# Patient Record
Sex: FEMALE | Race: ASIAN | NOT HISPANIC OR LATINO | Employment: UNEMPLOYED | URBAN - METROPOLITAN AREA
[De-identification: names, ages, dates, MRNs, and addresses within clinical notes are randomized per-mention and may not be internally consistent; named-entity substitution may affect disease eponyms.]

---

## 2023-12-05 ENCOUNTER — OFFICE VISIT (OUTPATIENT)
Dept: OBSTETRICS AND GYNECOLOGY | Facility: CLINIC | Age: 33
End: 2023-12-05
Payer: COMMERCIAL

## 2023-12-05 VITALS
DIASTOLIC BLOOD PRESSURE: 65 MMHG | BODY MASS INDEX: 27.21 KG/M2 | WEIGHT: 135 LBS | HEIGHT: 59 IN | HEART RATE: 60 BPM | SYSTOLIC BLOOD PRESSURE: 103 MMHG

## 2023-12-05 DIAGNOSIS — Z01.419 WELL WOMAN EXAM WITH ROUTINE GYNECOLOGICAL EXAM: Primary | ICD-10-CM

## 2023-12-05 NOTE — PATIENT INSTRUCTIONS
[facility]   [place], [place]   [phone]    [facility]  [place], [place]   [phone]

## 2023-12-05 NOTE — PROGRESS NOTES
Chief Complaint   Patient presents with    Annual Exam     Last ae and pap: 22  Patient states her csection site has been hurting         Nena Saavedra is a 33 y.o.  who presents for an annual examination   Reports one episode of the right side of her pfannenstiel hurting, but it was short and got better with pressure on that side. Denies any sensitivity of the incision.   Pap history:  Last Completed Pap Smear            PAP SMEAR (Every 3 Years) Next due on 2022  IGP, Apt HPV,rfx 16 / 18,45                  Prior abnormal paps: no  STDs  Sexually active: yes  History of STDs: no  Has had HPV vaccine: unknown  Contraception:  None currently; her periods are now regular but sometimes bleeding is only 2-3 days, other times 5 days.  Denies dysmenorrhea  She is considering another pregnancy.  She would like to conceive naturally.  She did her IVF through [facility].  She has been checking for ovulation but reports no positive tests before.    She did letrozole and a trigger shot x 1 cycle.    She does have eight embryos left.        Screening for BRCA-   Is patient's family history significant for BRCA risk factors? no    Past Medical History:   Diagnosis Date    Anemia     Chlamydia     Polycystic ovary syndrome     PONV (postoperative nausea and vomiting) 2021    Varicella      Past Surgical History:   Procedure Laterality Date     SECTION       OB History    Para Term  AB Living   1 1 1     1   SAB IAB Ectopic Molar Multiple Live Births             1      # Outcome Date GA Lbr Konstantin/2nd Weight Sex Delivery Anes PTL Lv   1 Term 21 39w0d  2750 g (6 lb 1 oz) F CS-LTranv   JUSTIN      Complications: Breech delivery      Social History     Tobacco Use    Smoking status: Never    Smokeless tobacco: Never   Vaping Use    Vaping Use: Never used   Substance Use Topics    Alcohol use: Never    Drug use: Never     Family History   Problem Relation Age of Onset    Aneurysm  "Paternal Grandfather     Breast cancer Paternal Grandmother         50s    Diabetes Maternal Grandfather     Breast cancer Paternal Aunt         40s    Brain cancer Paternal Aunt     Ovarian cancer Neg Hx     Uterine cancer Neg Hx     Colon cancer Neg Hx     Deep vein thrombosis Neg Hx     Pulmonary embolism Neg Hx      No current outpatient medications on file prior to visit.     No current facility-administered medications on file prior to visit.     No Known Allergies     Review of Systems  See HPI      OBJECTIVE:   Vitals:    12/05/23 1109   BP: 103/65   Pulse: 60   Weight: 61.2 kg (135 lb)   Height: 149.9 cm (59.02\")      Physical Exam  Exam conducted with a chaperone present.   Constitutional:       General: She is not in acute distress.     Appearance: She is well-developed. She is not diaphoretic.   HENT:      Head: Normocephalic and atraumatic.   Neck:      Thyroid: No thyromegaly.      Trachea: No tracheal deviation.   Cardiovascular:      Rate and Rhythm: Normal rate.      Heart sounds: Normal heart sounds. No murmur heard.     No friction rub. No gallop.   Pulmonary:      Effort: Pulmonary effort is normal. No respiratory distress.      Breath sounds: Normal breath sounds.   Chest:      Chest wall: No tenderness.   Breasts:     Right: No inverted nipple, mass, nipple discharge, skin change or tenderness.      Left: No inverted nipple, mass, nipple discharge, skin change or tenderness.   Abdominal:      General: There is no distension.      Palpations: Abdomen is soft. There is no mass.      Tenderness: There is no abdominal tenderness.   Genitourinary:     General: Normal vulva.      Labia:         Right: No rash, lesion or injury.         Left: No rash, lesion or injury.       Vagina: No vaginal discharge, tenderness or bleeding.      Cervix: No cervical motion tenderness, discharge or friability.      Uterus: Not deviated, not enlarged, not fixed and not tender.       Adnexa:         Right: No mass, " tenderness or fullness.          Left: No mass, tenderness or fullness.     Musculoskeletal:         General: No deformity. Normal range of motion.   Lymphadenopathy:      Cervical: No cervical adenopathy.   Skin:     General: Skin is warm and dry.      Findings: No rash.   Neurological:      Mental Status: She is alert and oriented to person, place, and time.   Psychiatric:         Behavior: Behavior normal.         Thought Content: Thought content normal.         Judgment: Judgment normal.         ASSESSMENT/PLAN:   (Z01.419) Well woman exam with routine gynecological exam - Plan: Chlamydia trachomatis, Neisseria gonorrhoeae, Trichomonas vaginalis, PCR - Swab, Cervix      Annual well woman exam:  Cervical cancer screening:    Denies cervical dysplasia in past   The patient is due for a pap in 2025.   HPV vaccination unsure, counseled on ability to receive till 47   Screening guidelines discussed with patient  Breast cancer screening:    Clinical breast exam recommended for age 20-39 years every 1-3 years   Mammogram recommended starting age 40    Breast self awareness encouraged  STD Screening   Testing desires swab, declines serum.    Contraception :   Encouraged PNV  She was counseled on OI and limitations of our ability to do this in our office (low dose, no IUI).  Reviewed risks including failure, ectopic pregnancy. She was provided phone numbers for [facility] as well  She was counseled on how to check for ovulation and to call if she desires a mid cycle progesterone level    Family history    does not demonstrate need for genetics referral   Healthy lifestyle counseling:   return for routine annual checkups        Return in about 1 year (around 12/5/2024) for Annual physical.

## 2023-12-07 LAB
C TRACH RRNA SPEC QL NAA+PROBE: NEGATIVE
N GONORRHOEA RRNA SPEC QL NAA+PROBE: NEGATIVE
T VAGINALIS RRNA SPEC QL NAA+PROBE: NEGATIVE

## 2024-12-10 ENCOUNTER — OFFICE VISIT (OUTPATIENT)
Dept: OBSTETRICS AND GYNECOLOGY | Facility: CLINIC | Age: 34
End: 2024-12-10
Payer: COMMERCIAL

## 2024-12-10 VITALS
BODY MASS INDEX: 27.82 KG/M2 | HEART RATE: 69 BPM | HEIGHT: 59 IN | SYSTOLIC BLOOD PRESSURE: 119 MMHG | DIASTOLIC BLOOD PRESSURE: 71 MMHG | WEIGHT: 138 LBS

## 2024-12-10 DIAGNOSIS — Z01.419 WELL WOMAN EXAM WITH ROUTINE GYNECOLOGICAL EXAM: Primary | ICD-10-CM

## 2024-12-10 RX ORDER — AMOXICILLIN 500 MG/1
TABLET, FILM COATED ORAL
COMMUNITY
Start: 2024-12-07

## 2024-12-10 RX ORDER — CHLORHEXIDINE GLUCONATE ORAL RINSE 1.2 MG/ML
SOLUTION DENTAL
COMMUNITY
Start: 2024-12-07

## 2024-12-10 NOTE — PROGRESS NOTES
Chief Complaint   Patient presents with    Well woman exam with routine gynecological exam     Last pap         Nena Saavedra is a 34 y.o.  who presents for an annual examination   Reports one episode of the right side of her pfannenstiel hurting, but it was short and got better with pressure on that side. Denies any sensitivity of the incision.   Pap history:  Last Completed Pap Smear            PAP SMEAR (Every 3 Years) Next due on 2022  IGP, Apt HPV,rfx 16 / 18,45                  Prior abnormal paps: no  STDs  Sexually active: yes  History of STDs: no  Has had HPV vaccine: unknown  Contraception:  None currently; her periods are now regular but sometimes bleeding is only 2-3 days, other times 5 days.  Denies dysmenorrhea  She is considering another pregnancy.  She would like to conceive naturally.  She did her IVF through [facility].  She has been checking for ovulation but reports no positive tests before.    She did letrozole and a trigger shot x 1 cycle.    She does have eight embryos left.        Screening for BRCA-   Is patient's family history significant for BRCA risk factors? no    Past Medical History:   Diagnosis Date    Anemia     Chlamydia     Polycystic ovary syndrome     PONV (postoperative nausea and vomiting) 2021    Varicella      Past Surgical History:   Procedure Laterality Date     SECTION       OB History    Para Term  AB Living   1 1 1     1   SAB IAB Ectopic Molar Multiple Live Births             1      # Outcome Date GA Lbr Konstantin/2nd Weight Sex Type Anes PTL Lv   1 Term 21 39w0d  2750 g (6 lb 1 oz) F CS-LTranv   JUSTIN      Complications: Breech delivery      Social History     Tobacco Use    Smoking status: Never    Smokeless tobacco: Never   Vaping Use    Vaping status: Never Used   Substance Use Topics    Alcohol use: Never    Drug use: Never     Family History   Problem Relation Age of Onset    Aneurysm Paternal Grandfather     Breast  "cancer Paternal Grandmother         50s    Diabetes Maternal Grandfather     Breast cancer Paternal Aunt         40s    Brain cancer Paternal Aunt     Ovarian cancer Neg Hx     Uterine cancer Neg Hx     Colon cancer Neg Hx     Deep vein thrombosis Neg Hx     Pulmonary embolism Neg Hx      Current Outpatient Medications on File Prior to Visit   Medication Sig Dispense Refill    amoxicillin (AMOXIL) 500 MG tablet       chlorhexidine (PERIDEX) 0.12 % solution        No current facility-administered medications on file prior to visit.     No Known Allergies     Review of Systems  See HPI      OBJECTIVE:   Vitals:    12/10/24 1133   BP: 119/71   Pulse: 69   Weight: 62.6 kg (138 lb)   Height: 149.9 cm (59.02\")      Physical Exam  Exam conducted with a chaperone present.   Constitutional:       General: She is not in acute distress.     Appearance: She is well-developed. She is not diaphoretic.   HENT:      Head: Normocephalic and atraumatic.   Neck:      Thyroid: No thyromegaly.      Trachea: No tracheal deviation.   Cardiovascular:      Rate and Rhythm: Normal rate.      Heart sounds: Normal heart sounds. No murmur heard.     No friction rub. No gallop.   Pulmonary:      Effort: Pulmonary effort is normal. No respiratory distress.      Breath sounds: Normal breath sounds.   Chest:      Chest wall: No tenderness.   Breasts:     Right: No inverted nipple, mass, nipple discharge, skin change or tenderness.      Left: No inverted nipple, mass, nipple discharge, skin change or tenderness.   Abdominal:      General: There is no distension.      Palpations: Abdomen is soft. There is no mass.      Tenderness: There is no abdominal tenderness.   Genitourinary:     General: Normal vulva.      Labia:         Right: No rash, lesion or injury.         Left: No rash, lesion or injury.       Vagina: No vaginal discharge, tenderness or bleeding.      Cervix: No cervical motion tenderness, discharge or friability.      Uterus: Not " deviated, not enlarged, not fixed and not tender.       Adnexa:         Right: No mass, tenderness or fullness.          Left: No mass, tenderness or fullness.     Musculoskeletal:         General: No deformity. Normal range of motion.   Lymphadenopathy:      Cervical: No cervical adenopathy.   Skin:     General: Skin is warm and dry.      Findings: No rash.   Neurological:      Mental Status: She is alert and oriented to person, place, and time.   Psychiatric:         Behavior: Behavior normal.         Thought Content: Thought content normal.         Judgment: Judgment normal.         ASSESSMENT/PLAN:   (Z01.419) Well woman exam with routine gynecological exam      Annual well woman exam:  Cervical cancer screening:    Denies cervical dysplasia in past   The patient is due for a pap in 2025.   HPV vaccination unsure, counseled on ability to receive till 47   Screening guidelines discussed with patient  Breast cancer screening:    Clinical breast exam recommended for age 20-39 years every 1-3 years   Mammogram recommended starting age 40    Breast self awareness encouraged  STD Screening   Testing desires swab, declines serum.    Contraception :   Encouraged PNV  She was counseled on OI and limitations of our ability to do this in our office (low dose, no IUI).  Reviewed risks including failure, ectopic pregnancy. She was provided phone numbers for [facility] as well  She was counseled on how to check for ovulation and to call if she desires a mid cycle progesterone level    Family history    does not demonstrate need for genetics referral   Healthy lifestyle counseling:   return for routine annual checkups        No follow-ups on file.

## 2025-07-22 ENCOUNTER — OFFICE VISIT (OUTPATIENT)
Dept: OBSTETRICS AND GYNECOLOGY | Facility: CLINIC | Age: 35
End: 2025-07-22
Payer: COMMERCIAL

## 2025-07-22 VITALS — BODY MASS INDEX: 27.86 KG/M2 | DIASTOLIC BLOOD PRESSURE: 70 MMHG | SYSTOLIC BLOOD PRESSURE: 114 MMHG | WEIGHT: 138 LBS

## 2025-07-22 DIAGNOSIS — N94.9 GENITAL LESION, FEMALE: Primary | ICD-10-CM

## 2025-07-22 RX ORDER — VALACYCLOVIR HYDROCHLORIDE 1 G/1
1000 TABLET, FILM COATED ORAL 2 TIMES DAILY
Qty: 20 TABLET | Refills: 0 | Status: SHIPPED | OUTPATIENT
Start: 2025-07-22 | End: 2025-08-01

## 2025-07-22 RX ORDER — CHLORAL HYDRATE 500 MG
CAPSULE ORAL
COMMUNITY
Start: 2025-07-01

## 2025-07-22 NOTE — PROGRESS NOTES
Patient or patient representative verbalized consent for the use of Ambient Listening during the visit with  Jina Simmons MD for chart documentation. 2025  14:59 EDT    History of Present Illness  The patient is a 35-year-old female who presents for a vaginal tear. She is a -0-0-1 who had a  in .    Vaginal Tear  - First noticed the tear on her left labia last night  - Initially attributed to overexertion during her home workout routine that included squats  - Discovered while washing and wiping the area, revealing blood and causing a stinging sensation  - Experiences stinging during urination  - No discharge or odor associated with the tear  - Initially thought it might be related to her menstrual cycle, but her last period ended on 2025    Menstrual History  - Periods are regular, lasting 4 to 5 days, and are not excessively heavy  - Experienced a particularly heavy period with abdominal pain two months ago    Sexual Activity  - Sexually active  - Does not use any form of birth control  - Open to pregnancy    Other Information  - Recent change in toilet paper, which she suspects might have caused irritation    GYNECOLOGICAL HISTORY:  - Last menstrual period: 2025 to 2025  - Duration: 4 to 5 days  - Frequency and flow: Regular, not excessively heavy  - Menstrual pain: Abdominal pain two months ago    CONTRACEPTION:  - None used  - Reproductive plans: Open to pregnancy    OBSTETRICAL HISTORY:  Obstetrics History discussed   1, Para 1-0-0-1    PAST SURGICAL HISTORY:  -  in       Vitals:    25 1431   BP: 114/70       Physical Exam  Chaperone present  Physical Exam  General Appearance: Normal.  Vital signs: Within normal limits.  HEENT: Within normal limits.  Respiratory: Within normal limits.  Genitourinary: Female: External Genitalia: Left labia shows a small blister-like lesion, described by the patient as painful and stinging.  Skin: Warm and dry,  no rash.  Neurological: Normal.      Results       Diagnosis Plan   1. Genital lesion, female  Herpes Simplex Virus (HSV) 1 & 2, OZZY          Assessment & Plan  1. Vaginal tear: Acute.  - Symptoms include pain, stinging, and light red blood mixed with water noticed on the left side of the labia. Physical exam findings suggest a tender blister-like appearance, which can be indicative of herpes simplex virus (HSV). No discharge or odor was noted. Differential diagnosis includes HSV, yeast infection, or accidental laceration.  - A swab test has been ordered to confirm the diagnosis, with results expected within 2 to 3 days.  - Antiviral medication has been prescribed and should be started immediately.  - If the swab test returns positive for HSV, an additional prescription will be sent for future use PRN. Counseled this is an STI. Reviewed transmission, importance of protection/abstinence, informing sexual partners, implications in pregnancy.    - If the test is negative, the medication should be discontinued, and the situation should be monitored.  - If another outbreak occurs, further testing will be needed.    Follow-up  - Results of the swab test are expected by 07/25/2025.  - Annual exam scheduled for 12/2025.      Jina Simmons MD  07/22/25 14:59 EDT     No follow-ups on file.

## 2025-07-26 LAB
HSV1 DNA SPEC QL NAA+PROBE: NEGATIVE
HSV2 DNA SPEC QL NAA+PROBE: POSITIVE